# Patient Record
Sex: FEMALE | Race: WHITE | NOT HISPANIC OR LATINO | Employment: STUDENT | URBAN - METROPOLITAN AREA
[De-identification: names, ages, dates, MRNs, and addresses within clinical notes are randomized per-mention and may not be internally consistent; named-entity substitution may affect disease eponyms.]

---

## 2017-06-09 ENCOUNTER — OFFICE VISIT (OUTPATIENT)
Dept: GASTROENTEROLOGY | Facility: CLINIC | Age: 22
End: 2017-06-09

## 2017-06-09 VITALS
WEIGHT: 155.6 LBS | SYSTOLIC BLOOD PRESSURE: 118 MMHG | DIASTOLIC BLOOD PRESSURE: 68 MMHG | BODY MASS INDEX: 23.58 KG/M2 | HEIGHT: 68 IN | TEMPERATURE: 99.1 F

## 2017-06-09 DIAGNOSIS — R10.13 EPIGASTRIC PAIN: Primary | ICD-10-CM

## 2017-06-09 DIAGNOSIS — R10.13 DYSPEPSIA: ICD-10-CM

## 2017-06-09 PROCEDURE — 99214 OFFICE O/P EST MOD 30 MIN: CPT | Performed by: INTERNAL MEDICINE

## 2017-06-09 RX ORDER — NORGESTIMATE AND ETHINYL ESTRADIOL 7DAYSX3 28
KIT ORAL
Refills: 1 | COMMUNITY
Start: 2017-05-20

## 2017-06-09 RX ORDER — RANITIDINE 300 MG/1
300 TABLET ORAL 2 TIMES DAILY PRN
Qty: 90 TABLET | Refills: 1 | Status: SHIPPED | OUTPATIENT
Start: 2017-06-09

## 2017-06-09 RX ORDER — PANTOPRAZOLE SODIUM 40 MG/1
40 TABLET, DELAYED RELEASE ORAL AS NEEDED
COMMUNITY
End: 2017-07-06 | Stop reason: ALTCHOICE

## 2017-06-09 RX ORDER — ESCITALOPRAM OXALATE 10 MG/1
TABLET ORAL
Refills: 0 | COMMUNITY
Start: 2017-05-20

## 2017-06-09 NOTE — PROGRESS NOTES
"Chief Complaint   Patient presents with   • sour stomach     burning       Subjective     HPI    Fior Ivan is a 21 y.o. female with no significant past medical history  who presents for evaluation of symptoms of \"sour stomach.\"  Symptoms started about 4 years ago.  She felt the Initial episode started after meal that did not set well with her.  She had some burning and acidic sensation.  It lasted for about a week and then gradually went away.  Since that time she has had intermittent symptoms.  Over the past year symptoms of increased.  She describes a burning epigastric sensation.  Gnawing in quality and radiating to her back.  Pain can get as severe as a  10/10.  No particular food precipitants that she has identified, including caffeine, spicy foods, acidic foods.  Worse with laying down and trying to go to sleep.  No associated nausea or vomiting, no dysphagia.  No diarrhea or constipation.   About a year ago she had a flare and was seen by her primary care physician in [place], [place].  He was concerned that she had an ulcer and started her on twice-daily pantoprazole.  This relieved her symptoms.   After symptoms were relieved, he recommended that she wean off the medication.  She did do that and then in the spring, she had a flare of symptoms.  This lasted about a week.  She resumed taking the pantoprazole and finally had relief of her symptoms after about a week.  She currently has been weaning down the pantoprazole again.  She is down to taking 1 pill about every 4 days.  She has had no flare since then.      No weight loss, no change in diet.      No excess NSAIDs    No family history of stomach ulcers or GI malignancies.  No smoking, social ETOH.  No abdominal surgeries.  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    She brings lab work from May 25 from her primary care physician.  It is notable for a normal CBC with a hemoglobin of 13.  A normal CMP.  It is notable for an elevated TSH and a mildly " low T4.  If she does have follow-up planned later this month with an endocrinologist.    PCP is Dr Deepthi Brooke at [facility], [place]    History reviewed. No pertinent past medical history.      Current Outpatient Prescriptions:   •  pantoprazole (PROTONIX) 40 MG EC tablet, Take 40 mg by mouth As Needed (normally Q 4 days)., Disp: , Rfl:   •  escitalopram (LEXAPRO) 10 MG tablet, TAKE 1 TABLET BY MOUTH ONCE DAILY, Disp: , Rfl: 0  •  raNITIdine (ZANTAC) 300 MG tablet, Take 1 tablet by mouth 2 (Two) Times a Day As Needed for Indigestion or Heartburn., Disp: 90 tablet, Rfl: 1  •  TRI-SPRINTEC 0.18/0.215/0.25 MG-35 MCG per tablet, TAKE ONE TABLET BY MOUTH DAILY, Disp: , Rfl: 1    No Known Allergies    Social History     Social History   • Marital status: Single     Spouse name: N/A   • Number of children: N/A   • Years of education: N/A     Occupational History   • Not on file.     Social History Main Topics   • Smoking status: Never Smoker   • Smokeless tobacco: Not on file   • Alcohol use Yes      Comment: social   • Drug use: No   • Sexual activity: Not on file     Other Topics Concern   • Not on file     Social History Narrative   • No narrative on file       History reviewed. No pertinent family history.    Review of Systems   Constitutional: Negative for activity change, appetite change and fatigue.   HENT: Negative for congestion, sore throat and trouble swallowing.    Respiratory: Negative.    Cardiovascular: Negative.    Gastrointestinal: Positive for abdominal pain. Negative for abdominal distention, blood in stool, constipation, diarrhea, nausea and vomiting.   Endocrine: Negative for cold intolerance and heat intolerance.   Genitourinary: Negative for difficulty urinating, dysuria, frequency and menstrual problem.   Musculoskeletal: Negative for arthralgias, back pain and myalgias.   Skin: Negative.    Hematological: Negative for adenopathy. Does not bruise/bleed easily.   All other systems  reviewed and are negative.      Objective     Vitals:    06/09/17 1012   BP: 118/68   Temp: 99.1 °F (37.3 °C)     Last 2 weights    06/09/17  1012   Weight: 155 lb 9.6 oz (70.6 kg)     Body mass index is 23.66 kg/(m^2).    Physical Exam   Constitutional: She is oriented to person, place, and time. She appears well-developed and well-nourished. No distress.   HENT:   Head: Normocephalic and atraumatic.   Right Ear: External ear normal.   Left Ear: External ear normal.   Nose: Nose normal.   Mouth/Throat: Oropharynx is clear and moist.   Eyes: Conjunctivae and EOM are normal. Right eye exhibits no discharge. Left eye exhibits no discharge. No scleral icterus.   Neck: Normal range of motion. Neck supple. No thyromegaly present.   No supraclavicular adenopathy   Cardiovascular: Normal rate, regular rhythm, normal heart sounds and intact distal pulses.  Exam reveals no gallop.    No murmur heard.  No lower extremity edema   Pulmonary/Chest: Effort normal and breath sounds normal. No respiratory distress. She has no wheezes.   Abdominal: Soft. Normal appearance and bowel sounds are normal. She exhibits no distension and no mass. There is no hepatosplenomegaly. There is no tenderness. There is no rigidity, no rebound and no guarding. No hernia.   Genitourinary:   Genitourinary Comments: Rectal exam deferred   Musculoskeletal: Normal range of motion. She exhibits no edema or tenderness.   No atrophy of upper or lower extremities.  Normal digits and nails of both hands.   Lymphadenopathy:     She has no cervical adenopathy.   Neurological: She is alert and oriented to person, place, and time. She displays no atrophy. Coordination normal.   Skin: Skin is warm and dry. No rash noted. She is not diaphoretic. No erythema.   Psychiatric: She has a normal mood and affect. Her behavior is normal. Judgment and thought content normal.   Vitals reviewed.      No results found for: WBC, RBC, HGB, HCT, MCV, MCH, MCHC, RDW, RDWSD, MPV,  PLT, NEUTRORELPCT, LYMPHORELPCT, MONORELPCT, EOSRELPCT, BASORELPCT, AUTOIGPER, NEUTROABS, LYMPHSABS, MONOSABS, EOSABS, BASOSABS, AUTOIGNUM, NRBC    No results found for: GLUCOSE, NA, K, CO2, CL, ANIONGAP, CREATININE, BUN, BCR, CALCIUM, EGFRIFNONA, ALKPHOS, PROTEINTOT, ALT, AST, BILITOT, ALBUMIN, GLOB, LABIL2      Imaging Results (last 7 days)     ** No results found for the last 168 hours. **            No notes on file    Assessment/Plan    1.Epigastric pain: burning in quality, intermittent but severe flares.  No risk factors for ulcers, reassuring lab work; ? PUD vs gastritis vs NERD vs H pylori    2. Dyspepsia: gnawing discomfort associated with above    Plan:  -will have her stop pantoprazole  -H pylori breath test in 2 weeks  -zantac up to twice daily as needed  -consider EGD if symptoms return with stopping PPI    Fior was seen today for sour stomach.    Diagnoses and all orders for this visit:    Epigastric pain  -     raNITIdine (ZANTAC) 300 MG tablet; Take 1 tablet by mouth 2 (Two) Times a Day As Needed for Indigestion or Heartburn.  -     H. Pylori Breath Test; Future    Dyspepsia  -     raNITIdine (ZANTAC) 300 MG tablet; Take 1 tablet by mouth 2 (Two) Times a Day As Needed for Indigestion or Heartburn.  -     H. Pylori Breath Test; Future        I have discussed the above plan with the patient.  They verbalize understanding and are in agreement with the plan.  They have been advised to contact the office for any questions, concerns, or changes related to their health.    Dictated utilizing Dragon dictation

## 2017-06-09 NOTE — PATIENT INSTRUCTIONS
Stop the pantoprazole.  You can take zantac up to twice a day as needed for symptoms    Schedule the h pylori breath test in 2 weeks    For any additional questions, concerns or changes to your condition after today's office visit please contact the office at [phone].

## 2017-06-23 ENCOUNTER — TELEPHONE (OUTPATIENT)
Dept: GASTROENTEROLOGY | Facility: CLINIC | Age: 22
End: 2017-06-23

## 2017-06-23 DIAGNOSIS — K21.9 GASTROESOPHAGEAL REFLUX DISEASE WITHOUT ESOPHAGITIS: Primary | ICD-10-CM

## 2017-07-06 ENCOUNTER — OFFICE VISIT (OUTPATIENT)
Dept: GASTROENTEROLOGY | Facility: CLINIC | Age: 22
End: 2017-07-06

## 2017-07-06 VITALS
DIASTOLIC BLOOD PRESSURE: 68 MMHG | WEIGHT: 153 LBS | SYSTOLIC BLOOD PRESSURE: 116 MMHG | BODY MASS INDEX: 23.19 KG/M2 | HEIGHT: 68 IN

## 2017-07-06 DIAGNOSIS — R10.13 EPIGASTRIC PAIN: Primary | ICD-10-CM

## 2017-07-06 PROCEDURE — 99212 OFFICE O/P EST SF 10 MIN: CPT | Performed by: INTERNAL MEDICINE

## 2017-07-06 RX ORDER — SPIRONOLACTONE 100 MG/1
TABLET, FILM COATED ORAL
Refills: 2 | COMMUNITY
Start: 2017-06-16

## 2017-07-06 NOTE — PROGRESS NOTES
Chief Complaint   Patient presents with   • Follow-up     Subjective     HPI  Fior Ivan is a 21 y.o. female who presents for follow up of epigastric pain.  At her last visit, she had been in the process of weaning pantoprazole.  I had prescribed zantac to use as needed during this time.    She has been off the pantoprazole since early June. She has only needed to take about 3 prn doses of the zantac.    She completed the H pylori breath tests 6/23, unfortunately no results were recorded in the system.    No nausea or vomiting.  BMs are normal.  Weight is stable.  Still with occasional deep belches. Otherwise feeling well.       History reviewed. No pertinent past medical history.    Social History     Social History   • Marital status: Single     Spouse name: N/A   • Number of children: N/A   • Years of education: N/A     Social History Main Topics   • Smoking status: Never Smoker   • Smokeless tobacco: None   • Alcohol use Yes      Comment: social   • Drug use: No   • Sexual activity: Not Asked     Other Topics Concern   • None     Social History Narrative         Current Outpatient Prescriptions:   •  escitalopram (LEXAPRO) 10 MG tablet, TAKE 1 TABLET BY MOUTH ONCE DAILY, Disp: , Rfl: 0  •  raNITIdine (ZANTAC) 300 MG tablet, Take 1 tablet by mouth 2 (Two) Times a Day As Needed for Indigestion or Heartburn., Disp: 90 tablet, Rfl: 1  •  spironolactone (ALDACTONE) 100 MG tablet, TAKE ONE TABLET BY MOUTH EVERY DAY, Disp: , Rfl: 2  •  TRI-SPRINTEC 0.18/0.215/0.25 MG-35 MCG per tablet, TAKE ONE TABLET BY MOUTH DAILY, Disp: , Rfl: 1    Review of Systems   Constitutional: Negative for activity change, appetite change and fatigue.   HENT: Negative for sore throat and trouble swallowing.    Gastrointestinal: Negative for abdominal distention, abdominal pain and blood in stool.   Endocrine: Negative for cold intolerance and heat intolerance.   Genitourinary: Negative for difficulty urinating, dysuria and frequency.    Musculoskeletal: Negative for arthralgias, back pain and myalgias.   Hematological: Negative for adenopathy. Does not bruise/bleed easily.   All other systems reviewed and are negative.      Objective   Vitals:    07/06/17 0811   BP: 116/68     Last Weight    07/06/17 0811   Weight: 153 lb (69.4 kg)     Body mass index is 23.26 kg/(m^2).      Physical Exam   Constitutional: She is oriented to person, place, and time. She appears well-developed and well-nourished. No distress.   HENT:   Head: Normocephalic and atraumatic.   Right Ear: External ear normal.   Left Ear: External ear normal.   Nose: Nose normal.   Eyes: Conjunctivae and EOM are normal. No scleral icterus.   Cardiovascular: Normal rate, regular rhythm, normal heart sounds and intact distal pulses.  Exam reveals no gallop.    No murmur heard.  No lower extremity edema   Pulmonary/Chest: Effort normal and breath sounds normal. No respiratory distress. She has no wheezes.   Abdominal: Soft. Normal appearance and bowel sounds are normal. She exhibits no distension and no mass. There is no hepatosplenomegaly. There is no tenderness. There is no rigidity, no rebound and no guarding. No hernia.   Genitourinary:   Genitourinary Comments: Rectal exam deferred   Musculoskeletal: Normal range of motion. She exhibits no edema or tenderness.   Normal digits and nails of both hands.  No atrophy or wasting of upper or lower extremeties   Neurological: She is alert and oriented to person, place, and time. She displays no atrophy. Coordination normal.   Skin: Skin is warm and dry. No rash noted. She is not diaphoretic. No erythema.   Psychiatric: She has a normal mood and affect. Her behavior is normal. Judgment and thought content normal.   Vitals reviewed.      No results found for: WBC, RBC, HGB, HCT, MCV, MCH, MCHC, RDW, RDWSD, MPV, PLT, NEUTRORELPCT, LYMPHORELPCT, MONORELPCT, EOSRELPCT, BASORELPCT, AUTOIGPER, NEUTROABS, LYMPHSABS, MONOSABS, EOSABS, BASOSABS,  AUTOIGNUM, NRBC    No results found for: GLUCOSE, BUN, CREATININE, EGFRIFNONA, EGFRIFAFRI, BCR, CO2, CALCIUM, PROTENTOTREF, ALBUMIN, LABIL2, AST, ALT      Imaging Results (last 7 days)     ** No results found for the last 168 hours. **            Assessment/Plan    Epigastric pain: improved, she has weaned off pantoprazole and has only needed a few doses of zantac for symptoms.  H pylori results were not recorded.    Plan:  -repeat H plyori breath test today-- no billing to her insurance-- confirmed with our  as previous results were lost  -will follow up the above test  -continue zantac as needed  -follow up as needed  -if symptoms return, recommend EGD  Fior was seen today for follow-up.    Diagnoses and all orders for this visit:    Epigastric pain  -     H. Pylori Breath Test        Dictated utilizing Dragon dictation

## 2018-12-14 ENCOUNTER — OFFICE (OUTPATIENT)
Dept: URBAN - METROPOLITAN AREA CLINIC 75 | Facility: CLINIC | Age: 23
End: 2018-12-14

## 2018-12-14 VITALS
HEART RATE: 92 BPM | SYSTOLIC BLOOD PRESSURE: 122 MMHG | HEIGHT: 69 IN | WEIGHT: 168 LBS | DIASTOLIC BLOOD PRESSURE: 80 MMHG | RESPIRATION RATE: 17 BRPM

## 2018-12-14 DIAGNOSIS — R10.13 EPIGASTRIC PAIN: ICD-10-CM

## 2018-12-14 PROCEDURE — 99203 OFFICE O/P NEW LOW 30 MIN: CPT

## 2020-11-25 ENCOUNTER — HOSPITAL ENCOUNTER (OUTPATIENT)
Dept: OTHER | Facility: HOSPITAL | Age: 25
Discharge: HOME | End: 2020-11-25
Attending: EMERGENCY MEDICINE

## 2020-11-30 LAB
CONV QUANTIFERON TB GOLD: NEGATIVE
QUANTIFERON CRITERIA: NORMAL
QUANTIFERON MITOGEN VALUE: >10 IU/ML
QUANTIFERON NIL VALUE: 0.05 IU/ML
QUANTIFERON TB1 AG VALUE: 0.06 IU/ML
QUANTIFERON TB2 AG VALUE: 0.04 IU/ML